# Patient Record
Sex: MALE | Race: WHITE | NOT HISPANIC OR LATINO | Employment: OTHER | URBAN - METROPOLITAN AREA
[De-identification: names, ages, dates, MRNs, and addresses within clinical notes are randomized per-mention and may not be internally consistent; named-entity substitution may affect disease eponyms.]

---

## 2019-08-07 ENCOUNTER — NEW PATIENT COMPREHENSIVE (OUTPATIENT)
Dept: URBAN - METROPOLITAN AREA CLINIC 36 | Facility: CLINIC | Age: 64
End: 2019-08-07

## 2019-08-07 DIAGNOSIS — H25.811: ICD-10-CM

## 2019-08-07 DIAGNOSIS — H25.812: ICD-10-CM

## 2019-08-07 DIAGNOSIS — H52.7: ICD-10-CM

## 2019-08-07 DIAGNOSIS — H40.033: ICD-10-CM

## 2019-08-07 PROCEDURE — 92132 CPTRZD OPH DX IMG ANT SGM: CPT

## 2019-08-07 PROCEDURE — 92015 DETERMINE REFRACTIVE STATE: CPT

## 2019-08-07 PROCEDURE — 92004 COMPRE OPH EXAM NEW PT 1/>: CPT

## 2019-08-07 ASSESSMENT — VISUAL ACUITY
OS_BAT: 20/100
OS_SC: J4
OD_CC: J1
OD_SC: 20/400
OD_BAT: 20/40
OD_SC: J3
OD_CC: 20/20-2
OS_CC: 20/25
OS_SC: CF 6FT
OS_CC: J1

## 2019-08-07 ASSESSMENT — TONOMETRY
OD_IOP_MMHG: 20
OS_IOP_MMHG: 20

## 2019-08-30 ENCOUNTER — GLAUCOMA EVAL (OUTPATIENT)
Dept: URBAN - METROPOLITAN AREA CLINIC 36 | Facility: CLINIC | Age: 64
End: 2019-08-30

## 2019-08-30 VITALS
SYSTOLIC BLOOD PRESSURE: 157 MMHG | RESPIRATION RATE: 14 BRPM | DIASTOLIC BLOOD PRESSURE: 89 MMHG | HEART RATE: 81 BPM | HEIGHT: 60 IN

## 2019-08-30 DIAGNOSIS — H40.033: ICD-10-CM

## 2019-08-30 DIAGNOSIS — H25.812: ICD-10-CM

## 2019-08-30 DIAGNOSIS — H40.023: ICD-10-CM

## 2019-08-30 DIAGNOSIS — H25.811: ICD-10-CM

## 2019-08-30 PROCEDURE — 92020 GONIOSCOPY: CPT

## 2019-08-30 PROCEDURE — 76514 ECHO EXAM OF EYE THICKNESS: CPT

## 2019-08-30 PROCEDURE — 92014 COMPRE OPH EXAM EST PT 1/>: CPT

## 2019-08-30 RX ORDER — PREDNISOLONE ACETATE 10 MG/ML: 1 SUSPENSION/ DROPS OPHTHALMIC

## 2019-08-30 ASSESSMENT — PACHYMETRY
OD_CT_UM: 588
OS_CT_UM: 602

## 2019-08-30 ASSESSMENT — VISUAL ACUITY
OD_CC: 20/25
OD_CC: J1
OS_CC: 20/25
OS_CC: J1

## 2019-08-30 ASSESSMENT — TONOMETRY
OD_IOP_MMHG: 14
OS_IOP_MMHG: 15

## 2019-09-11 ENCOUNTER — SURGERY/PROCEDURE (OUTPATIENT)
Dept: URBAN - METROPOLITAN AREA SURGERY 14 | Facility: SURGERY | Age: 64
End: 2019-09-11

## 2019-09-11 ENCOUNTER — PRE-OP/H&P (OUTPATIENT)
Dept: URBAN - METROPOLITAN AREA SURGERY 14 | Facility: SURGERY | Age: 64
End: 2019-09-11

## 2019-09-11 DIAGNOSIS — H40.023: ICD-10-CM

## 2019-09-11 DIAGNOSIS — H40.033: ICD-10-CM

## 2019-09-11 PROCEDURE — 66761 REVISION OF IRIS: CPT

## 2019-09-11 PROCEDURE — 99211T TECH SERVICE

## 2019-09-27 ENCOUNTER — FOLLOW UP (OUTPATIENT)
Dept: URBAN - METROPOLITAN AREA CLINIC 36 | Facility: CLINIC | Age: 64
End: 2019-09-27

## 2019-09-27 DIAGNOSIS — H40.023: ICD-10-CM

## 2019-09-27 DIAGNOSIS — H40.033: ICD-10-CM

## 2019-09-27 PROCEDURE — 92225 OPHTHALMOSCOPY (INITIAL): CPT

## 2019-09-27 PROCEDURE — 92132 CPTRZD OPH DX IMG ANT SGM: CPT

## 2019-09-27 PROCEDURE — 92012 INTRM OPH EXAM EST PATIENT: CPT

## 2019-09-27 PROCEDURE — 92020 GONIOSCOPY: CPT

## 2019-09-27 PROCEDURE — 92250 FUNDUS PHOTOGRAPHY W/I&R: CPT

## 2019-09-27 ASSESSMENT — VISUAL ACUITY
OD_CC: 20/25+2
OS_CC: 20/25

## 2019-09-27 ASSESSMENT — TONOMETRY
OS_IOP_MMHG: 19
OD_IOP_MMHG: 18

## 2021-03-06 ENCOUNTER — HOSPITAL ENCOUNTER (EMERGENCY)
Dept: HOSPITAL 47 - EC | Age: 66
Discharge: HOME | End: 2021-03-06
Payer: MEDICARE

## 2021-03-06 VITALS — SYSTOLIC BLOOD PRESSURE: 160 MMHG | RESPIRATION RATE: 19 BRPM | DIASTOLIC BLOOD PRESSURE: 79 MMHG | HEART RATE: 100 BPM

## 2021-03-06 VITALS — TEMPERATURE: 98.4 F

## 2021-03-06 DIAGNOSIS — F17.200: ICD-10-CM

## 2021-03-06 DIAGNOSIS — R10.9: ICD-10-CM

## 2021-03-06 DIAGNOSIS — Z85.028: ICD-10-CM

## 2021-03-06 DIAGNOSIS — R33.9: Primary | ICD-10-CM

## 2021-03-06 PROCEDURE — 99284 EMERGENCY DEPT VISIT MOD MDM: CPT

## 2021-03-06 PROCEDURE — 74022 RADEX COMPL AQT ABD SERIES: CPT

## 2021-03-06 PROCEDURE — 51702 INSERT TEMP BLADDER CATH: CPT

## 2021-03-06 NOTE — XR
EXAM:

  XR Abdomen, 2 Views and XR Chest, 1 View

 

CLINICAL HISTORY:

  ITS.REASON XR Reason: pain

 

TECHNIQUE:

  Frontal view of the chest, frontal view of the abdomen/pelvis and 

upright or decubitus view of the abdomen.

 

COMPARISON:

  No relevant prior studies available.

 

FINDINGS:

  Lungs:  Unremarkable.  No consolidation.

  Pleural space:  Unremarkable.  No pneumothorax.

  Heart:  Unremarkable.  No cardiomegaly.

  Mediastinum:  Unremarkable.

  Intraperitoneal space:  No free air.

  Gastrointestinal tract:  Unremarkable.  No dilation.  Mild fecal burden 

throughout the colon.

  Bones/joints:  Unremarkable.

 

IMPRESSION:     

1.  No acute pulmonary process.

 

2.  Nonobstructive bowel gas pattern.  No free air under the 

hemidiaphragms.  Mild fecal burden throughout the colon.

## 2021-03-06 NOTE — ED
Male Urogenital HPI





- General


Chief complaint: Urogenital


Stated complaint: ABD pain/post surgery


Time Seen by Provider: 03/06/21 02:45


Source: patient, RN notes reviewed, old records reviewed


Mode of arrival: ambulatory


Limitations: no limitations





- History of Present Illness


Initial comments: 





This is a 65-year-old male to the ER for evaluation patient Dese for evaluation 

regards to severe severe abdominal pain.  Recent prostate procedure with Dr. Vega, patient continues to present for severe abdominal pain and inability 

urinary


MD Complaint: other (Abdominal pain and inability ER today)


-: hour(s)


Radiation: none


Severity: severe


Quality: burning, dull


Consistency: constant


Improves with: none


Worsens with: none


recent surgery


Reports: denies other symptoms





- Related Data


                                    Allergies











Allergy/AdvReac Type Severity Reaction Status Date / Time


 


No Known Allergies Allergy   Verified 03/06/21 02:44














Review of Systems


ROS Statement: 


Those systems with pertinent positive or pertinent negative responses have been 

documented in the HPI.





ROS Other: All systems not noted in ROS Statement are negative.





Past Medical History


Past Medical History: Cancer, GERD/Reflux, Prostate Disorder


Additional Past Medical History / Comment(s): back pain


History of Any Multi-Drug Resistant Organisms: None Reported


Past Surgical History: Back Surgery, Prostate Surgery


Additional Past Surgical History / Comment(s): stomach ulcer surgery


Past Psychological History: No Psychological Hx Reported


Smoking Status: Current every day smoker


Past Alcohol Use History: None Reported


Past Drug Use History: None Reported





General Exam


Limitations: no limitations


General appearance: alert, in no apparent distress


Head exam: Present: atraumatic, normocephalic, normal inspection


Eye exam: Present: normal appearance, PERRL, EOMI.  Absent: scleral icterus, 

conjunctival injection, periorbital swelling


ENT exam: Present: normal exam, mucous membranes moist


Neck exam: Present: normal inspection.  Absent: tenderness, meningismus, 

lymphadenopathy


Respiratory exam: Present: normal lung sounds bilaterally.  Absent: respiratory 

distress, wheezes, rales, rhonchi, stridor


Cardiovascular Exam: Present: regular rate, normal rhythm, normal heart sounds. 

Absent: systolic murmur, diastolic murmur, rubs, gallop, clicks


GI/Abdominal exam: Present: soft, normal bowel sounds.  Absent: distended, 

tenderness, guarding, rebound, rigid


Extremities exam: Present: normal inspection, full ROM, normal capillary refill.

 Absent: tenderness, pedal edema, joint swelling, calf tenderness


Back exam: Present: normal inspection


Neurological exam: Present: alert, oriented X3, CN II-XII intact


Psychiatric exam: Present: normal affect, normal mood


Skin exam: Present: warm, dry, intact, normal color.  Absent: rash





Course


                                   Vital Signs











  03/06/21 03/06/21





  02:44 03:00


 


Temperature 98.4 F 


 


Pulse Rate 132 H 100


 


Respiratory 18 19





Rate  


 


Blood Pressure 178/91 160/79


 


O2 Sat by Pulse 96 98





Oximetry  














- Reevaluation(s)


Reevaluation #1: 





Medical record is reviewed





Patient symptoms are improved and resolved here in the ER





Chen is placed without difficulty





Patient informed of results, questions answered





Patient feels good for discharge home











Medical Decision Making





- Medical Decision Making





65 male DF for evaluation of significant abdominal pain recent prostate 

procedure.  Patient had for removed, urinary retention as returned for is 

replaced today with adequate output patient's symptoms resolved.  He can be d

ischarged home





Disposition


Clinical Impression: 


 Urinary retention





Disposition: HOME SELF-CARE


Condition: Good


Instructions (If sedation given, give patient instructions):  Urinary Retention 

in Men (ED)


Is patient prescribed a controlled substance at d/c from ED?: No


Referrals: 


None,Stated [REFERRING] - 1-2 days